# Patient Record
Sex: MALE | Race: WHITE | NOT HISPANIC OR LATINO | Employment: STUDENT | URBAN - METROPOLITAN AREA
[De-identification: names, ages, dates, MRNs, and addresses within clinical notes are randomized per-mention and may not be internally consistent; named-entity substitution may affect disease eponyms.]

---

## 2022-08-19 ENCOUNTER — HOSPITAL ENCOUNTER (EMERGENCY)
Facility: HOSPITAL | Age: 22
Discharge: HOME | End: 2022-08-20
Attending: EMERGENCY MEDICINE
Payer: COMMERCIAL

## 2022-08-19 VITALS
OXYGEN SATURATION: 97 % | SYSTOLIC BLOOD PRESSURE: 147 MMHG | HEIGHT: 70 IN | RESPIRATION RATE: 18 BRPM | DIASTOLIC BLOOD PRESSURE: 82 MMHG | HEART RATE: 80 BPM | BODY MASS INDEX: 25.05 KG/M2 | WEIGHT: 175 LBS

## 2022-08-19 DIAGNOSIS — S61.219A LACERATION OF FINGER: Primary | ICD-10-CM

## 2022-08-19 PROCEDURE — 90471 IMMUNIZATION ADMIN: CPT

## 2022-08-19 PROCEDURE — 90715 TDAP VACCINE 7 YRS/> IM: CPT

## 2022-08-19 PROCEDURE — 99282 EMERGENCY DEPT VISIT SF MDM: CPT | Performed by: EMERGENCY MEDICINE

## 2022-08-19 PROCEDURE — 99283 EMERGENCY DEPT VISIT LOW MDM: CPT

## 2022-08-19 RX ORDER — TRANEXAMIC ACID 100 MG/ML
1000 INJECTION, SOLUTION INTRAVENOUS ONCE
Status: COMPLETED | OUTPATIENT
Start: 2022-08-19 | End: 2022-08-19

## 2022-08-19 RX ORDER — LIDOCAINE HYDROCHLORIDE AND EPINEPHRINE 10; 10 MG/ML; UG/ML
10 INJECTION, SOLUTION INFILTRATION; PERINEURAL ONCE
Status: DISCONTINUED | OUTPATIENT
Start: 2022-08-19 | End: 2022-08-19

## 2022-08-19 RX ORDER — LIDOCAINE HYDROCHLORIDE AND EPINEPHRINE 10; 10 MG/ML; UG/ML
20 INJECTION, SOLUTION INFILTRATION; PERINEURAL ONCE
Status: COMPLETED | OUTPATIENT
Start: 2022-08-19 | End: 2022-08-19

## 2022-08-19 RX ADMIN — TRANEXAMIC ACID 1000 MG: 100 INJECTION, SOLUTION INTRAVENOUS at 22:55

## 2022-08-19 RX ADMIN — LIDOCAINE HYDROCHLORIDE,EPINEPHRINE BITARTRATE 20 ML: 10; .01 INJECTION, SOLUTION INFILTRATION; PERINEURAL at 22:17

## 2022-08-19 RX ADMIN — TETANUS TOXOID, REDUCED DIPHTHERIA TOXOID AND ACELLULAR PERTUSSIS VACCINE, ADSORBED 0.5 ML: 5; 2.5; 8; 8; 2.5 SUSPENSION INTRAMUSCULAR at 22:18

## 2022-08-20 NOTE — DISCHARGE INSTRUCTIONS
You were evaluated for a laceration to the left thumb  There is no bone involvement  There is nothing to suture  A tetanus vaccine was given  Bleeding was controlled after lidocaine with 1% epinephrine injection, topical TXA, and surgifoam  Please follow-up with your primary care doctor for continued monitoring and further management  PLEASE RETURN TO THE NEAREST EMERGENCY ROOM IF YOU DEVELOP FEVER, CHILLS, UNCONTROLLED BLEEDING FROM THUMB, PUS DRAINING FROM THE WOUND

## 2022-08-20 NOTE — ED ATTENDING ATTESTATION
Final Diagnosis:  1  Laceration of finger      ED Course as of 08/20/22 1803   Fri Aug 19, 2022   2238 Patient has continued venous oozing  Will do topical TXA    +/- cautery       ISara MD, saw and evaluated the patient  All available labs and X-rays were ordered by me or the resident and have been reviewed by myself  I discussed the patient with the resident / non-physician and agree with the resident's / non-physician practitioner's findings and plan as documented in the resident's / non-physician practicitioner's note, except where noted  At this point, I agree with the current assessment done in the ED  I was present during key portions of all procedures performed unless otherwise stated  Chief Complaint   Patient presents with    Laceration     Pt cut left thumb with knife     This is a 24 y o  male presenting for evaluation of LEFT thumb laceraiton cutting the tip of LEFT thumb off  Uncertain last tetanus  PMH:   has no past medical history on file  PSH:   has no past surgical history on file  Social:  Social History     Substance and Sexual Activity   Alcohol Use Not Currently     Social History     Tobacco Use   Smoking Status Not on file   Smokeless Tobacco Never Used     Social History     Substance and Sexual Activity   Drug Use Yes    Types: Marijuana     PE:  Vitals:    08/19/22 2112   BP: 147/82   BP Location: Right arm   Pulse: 80   Resp: 18   SpO2: 97%   Weight: 79 4 kg (175 lb)   Height: 5' 10" (1 778 m)   General: VS reviewed  Appears in NAD  awake, alert  Well-nourished, well-developed  Appears stated age  Speaking normally in full sentences  Head: Normocephalic, atraumatic  Eyes: EOM-I  No diplopia  No hyphema  No subconjunctival hemorrhages  Symmetrical lids  ENT: Atraumatic external nose and ears  MMM  No malocclusion  No stridor  Normal phonation  No drooling  Normal swallowing  Neck: No JVD    CV: No pallor noted  Lungs:   No tachypnea  No respiratory distress  MSK:   FROM spontaneously  Skin: Dry, laceration present on distal thumb  It looks like there's the tiniest little venous ooze occurrping on the more radial aspect of the laceration  It's very very slowly dripping  Neuro: Awake, alert, GCS15, CN II-XII grossly intact  Motor grossly intact  Psychiatric/Behavioral: Appropriate mood and affect   Exam: deferred  A:  - partial thumb tip laceration  P:  - local wound care ? apply finger tourniquet ? topical lidocaine ? inject lido/epi ? topical TXA  - nothing to suture  - definitely no bone involvement  - tetanus vaccine   - cobane dressing      - 13 point ROS was performed and all are normal unless stated in the history above  - Nursing note reviewed  Vitals reviewed  - Orders placed by myself and/or advanced practitioner / resident     - Previous chart was reviewed  - No language barrier    - History obtained from patient  - There are no limitations to the history obtained  - Critical care time: Not applicable for this patient  Code Status: No Order  Advance Directive and Living Will:      Power of :    POLST:      Medications   tetanus-diphtheria-acellular pertussis (BOOSTRIX) IM injection 0 5 mL (0 5 mL Intramuscular Given 8/19/22 2218)   lidocaine-epinephrine (XYLOCAINE/EPINEPHRINE) 1 %-1:100,000 injection 20 mL (20 mL Infiltration Given by Other 8/19/22 2217)   tranexamic acid 100mg/mL (for epistaxis) 1,000 mg (1,000 mg Nasal Given by Other 8/19/22 9698)     No orders to display     No orders of the defined types were placed in this encounter      Labs Reviewed - No data to display  Time reflects when diagnosis was documented in both MDM as applicable and the Disposition within this note       Time User Action Codes Description Comment    8/19/2022 10:11 PM Feliz Monahan Add [[id number]] Laceration of finger           ED Disposition       ED Disposition   Discharge    Condition   Stable    Date/Time   Sat Aug 20, 2022 12:15 AM    Comment   Ronal Norman discharge to home/self care  Follow-up Information    None       There are no discharge medications for this patient  No discharge procedures on file  None       Portions of the record may have been created with voice recognition software  Occasional wrong word or "sound a like" substitutions may have occurred due to the inherent limitations of voice recognition software  Read the chart carefully and recognize, using context, where substitutions have occurred      Electronically signed by:  Mariela Murray

## 2022-08-20 NOTE — ED PROVIDER NOTES
History  Chief Complaint   Patient presents with    Laceration     Pt cut left thumb with knife     59-year-old healthy male presents to the ED for left thumb laceration  States he was cutting up spinach tonight when the knife slipped and he accidentally cut the tip of his left thumb off  No other injuries  Unsure of tetanus vaccine status  Denies being on blood thinners  History provided by:  Patient   used: No        None       History reviewed  No pertinent past medical history  History reviewed  No pertinent surgical history  History reviewed  No pertinent family history  I have reviewed and agree with the history as documented  E-Cigarette/Vaping    E-Cigarette Use Never User      E-Cigarette/Vaping Substances     Social History     Tobacco Use    Smokeless tobacco: Never Used   Vaping Use    Vaping Use: Never used   Substance Use Topics    Alcohol use: Not Currently    Drug use: Yes     Types: Marijuana        Review of Systems   Constitutional: Negative for chills and fever  Skin:        Left thumb laceration   Neurological: Negative for weakness and numbness  All other systems reviewed and are negative  Physical Exam  ED Triage Vitals [08/19/22 2112]   Temp Pulse Respirations Blood Pressure SpO2   -- 80 18 147/82 97 %      Temp src Heart Rate Source Patient Position - Orthostatic VS BP Location FiO2 (%)   -- Monitor Lying Right arm --      Pain Score       5             Orthostatic Vital Signs  Vitals:    08/19/22 2112   BP: 147/82   Pulse: 80   Patient Position - Orthostatic VS: Lying       Physical Exam  Vitals and nursing note reviewed  Constitutional:       General: He is not in acute distress  Appearance: Normal appearance  He is not ill-appearing, toxic-appearing or diaphoretic  HENT:      Head: Normocephalic and atraumatic     Eyes:      Conjunctiva/sclera: Conjunctivae normal    Cardiovascular:      Rate and Rhythm: Normal rate and regular rhythm  Pulses: Normal pulses  Heart sounds: Normal heart sounds  Pulmonary:      Effort: Pulmonary effort is normal  No respiratory distress  Breath sounds: Normal breath sounds  No wheezing  Skin:     General: Skin is warm and dry  Capillary Refill: Capillary refill takes less than 2 seconds  Comments: Tip of left thumb avulsion  Blood is oozing very slowly from the wound  There is no bone involvement  Neurological:      General: No focal deficit present  Mental Status: He is alert and oriented to person, place, and time  ED Medications  Medications   tetanus-diphtheria-acellular pertussis (BOOSTRIX) IM injection 0 5 mL (0 5 mL Intramuscular Given 8/19/22 2218)   lidocaine-epinephrine (XYLOCAINE/EPINEPHRINE) 1 %-1:100,000 injection 20 mL (20 mL Infiltration Given by Other 8/19/22 2217)   tranexamic acid 100mg/mL (for epistaxis) 1,000 mg (1,000 mg Nasal Given by Other 8/19/22 2255)       Diagnostic Studies  Results Reviewed     None                 No orders to display         Procedures  Procedures      ED Course  ED Course as of 08/20/22 0150   [facility] Aug 19, 2022   2137 Blood Pressure: 147/82   2137 Pulse: 80   2137 Respirations: 18   2137 SpO2: 97 %   2158 Left thumb tourniquet applied  Lidocaine w 1% epi injected into wound  Good hemostasis achieved  Will reassess in a few minutes  2236 On reassessment, bleeding continued  More lido w/ epi injected without much improvement  Will use topical TXA next  2257 Topical TXA applied  Will reassess in 15 minutes  2341 Continued venous oozing  Topical TXA applied again  2351 Took down finger tourniquet  Surgifoam applied and thumb is wrapped with kerlix  Will reassess in 15 minutes  Sat Aug 20, 2022   0011 Good hemostasis achieved after application of surgifoam  Patient was monitored for 20 minutes after it was applied  Ok to discharge home                                          MDM  Number of Diagnoses or Management Options  Laceration of finger  Diagnosis management comments: 20-year-old healthy male presents today for evaluation of left thumb laceration  Vitals stable  Exam significant for left thumb tip avulsion with slow venous blood oozing from the wound  There is no bony involvement  There is nothing to suture  Tetanus vaccine given  Tourniquet applied to left thumb initially  Good hemostasis achieved with lidocaine with 1% epinephrine injected into the wound, topical TXA, and surgifoam   Stable for discharge home  Return precautions given  Instructed patient to follow-up with PCP for continued monitoring and further management  Patient agrees with the plan of care  Disposition  Final diagnoses:   Laceration of finger     Time reflects when diagnosis was documented in both MDM as applicable and the Disposition within this note     Time User Action Codes Description Comment    8/19/2022 10:11 PM Lobo Christopher Add [[id number]] Laceration of finger       ED Disposition     ED Disposition   Discharge    Condition   Stable    Date/Time   Sat Aug 20, 2022 12:15 AM    Comment   Candy Means discharge to home/self care  Follow-up Information    None         There are no discharge medications for this patient  No discharge procedures on file  PDMP Review     None           ED Provider  Attending physically available and evaluated Candy Means I managed the patient along with the ED Attending      Electronically Signed by         Laretta Brittle, MD  08/20/22 7403

## 2023-12-04 ENCOUNTER — TELEPHONE (OUTPATIENT)
Dept: PHYSICAL THERAPY | Facility: REHABILITATION | Age: 23
End: 2023-12-04

## 2023-12-04 NOTE — TELEPHONE ENCOUNTER
[address] [facility] received scheduling request for this patient for a direct access evaluation; [facility] called patient approx 3:45 today and left voicemail offering to schedule.

## 2024-01-17 NOTE — PROGRESS NOTES
1. Acute midline low back pain without sciatica      Orders Placed This Encounter   Procedures   • XR spine lumbar 2 or 3 views injury   • Ambulatory Referral to Physical Therapy     No orders of the defined types were placed in this encounter.     IMAGING STUDIES: (I personally reviewed images in PACS and report):     PAST REPORTS:    ASSESSMENT/PLAN:    Acute low back pain s/p deadlift injury  DOI around September 2023  FUI around 4 months    DDX:  Lumbar muscle strain  Pars injury    Patient prefers to check insurance coverage before deciding X-ray and physical therapy.    Repeat X-ray next visit: None    Return if symptoms worsen or fail to improve.    Patient instructions below verbally summarized in person during encounter:  Patient Instructions   Start physical therapy  Avoid deadlift or extension exercises    _________________________________________________________________________    HISTORY OF PRESENT ILLNESS:    23 y.o. male Sagrario presents for initial evaluation of insidious onset low back pain started about 4 months ago after deadlift injury.    1/18/2024:  Today patient reports     Athlete: No  Injury related: lifting a heavy object, was performing a 225 lbs deadlift and felt a popping sensation. No immediate pain. Started to feel pain and stiffness the day after.   PMHX: 2014 back injury after similar lifting injury, lasted 4 months and resolved with home stretching.    Low back pain:  Starting/onset: acute onset  Location: mid lumbar area  Pain scale: 2/10, pain from condition does not interfere with activities of daily living  Description: aching and dull  Radiation: none  Night pain: no  Rest pain: no  Aggravation: physical activity, twisting/turning, and lifting  Neuro sx: denies weakness, numbness, tingling, or burning pain in either leg  Red flag symptoms: denies any saddle anesthesia, new onset bowel/bladder incontinence, fevers, personal history of cancer, unintentional weight loss,  "weakness  Treatment: home exercises  Previous PT: none (due to high co-pay)  Previous epidural: none    Review of Systems    Following history reviewed and updated:  Historical Information   Past Medical History:   Diagnosis Date   • Ankle fracture    • Ankle ligament laxity      History reviewed. No pertinent surgical history.  Social History     Socioeconomic History   • Marital status: Single     Spouse name: Not on file   • Number of children: Not on file   • Years of education: Not on file   • Highest education level: Not on file   Occupational History   • Not on file   Tobacco Use   • Smoking status: Never   • Smokeless tobacco: Never   Vaping Use   • Vaping status: Never Used   Substance and Sexual Activity   • Alcohol use: Not Currently     Comment: occ   • Drug use: Yes     Types: Marijuana   • Sexual activity: Yes     Partners: Female   Other Topics Concern   • Not on file   Social History Narrative   • Not on file     Social Determinants of Health     Financial Resource Strain: Not on file   Food Insecurity: Not on file   Transportation Needs: Not on file   Physical Activity: Not on file   Stress: Not on file   Social Connections: Not on file   Intimate Partner Violence: Not on file   Housing Stability: Not on file     History reviewed. No pertinent family history.   No Known Allergies    /82 (BP Location: Left arm, Patient Position: Sitting, Cuff Size: Standard)   Ht 5' 10\" (1.778 m)   Wt 79.4 kg (175 lb)   BMI 25.11 kg/m²   Physical Exam  Constitutional:  Vitals and nursing note reviewed.   General: Normal appearance. Not ill-appearing, toxic-appearing or diaphoretic. not in acute distress.  HENT: Head is normocephalic and atraumatic. Bilateral external ear and nose normal  Eyes: No discharge. Extraocular movements intact.   Cardiovascular: Normal rate  Pulmonary:  Pulmonary effort is normal. No respiratory distress.   Musculoskeletal: No gross injury or deformity except for other than mention " below.  Skin: Skin is warm.   Neurological: Awake, alert, and mental status is at baseline. No gross focal deficit present.  Psychiatric:  Mood normal. Behavior normal.     Ortho Exam    Lumbar Spine Examination:  Patient ambulates with Normal gait pattern  Assistive Device: No  Skin is warm and dry to touch with no wounds, erythema, increased warmth, or ecchymosis     + L5-S1 midline tenderness  No paraspinal tenderness  Intact sensation to light touch over upper thigh (L1), mid thigh (L2), medial knee (L3), medial malleolus (L4), dorsum of foot (L5), lateral malleolus (S1), and posterior knee (S2)  Full active ROM without pain or limitations in lumbar flexion (90°), extension (to neutral), lateral bending (45° left, 45° right) and rotation (45° left, 45° right)  5/5 strength with hip flexion (L2), knee extension (L3), ankle dorsiflexion (L4), great toe extension (L5), and ankle plantarflexion (S1)  Straight leg raise (supine): Negative bilaterally  Stork test: + Pain left side  Clonus (S1-S2):  negative  Patellar reflex (L3-L4):  2+ and symmetric  Candie SI: No pain    Right hip:  Tenderness: None  Full passive ROM with flexion 140°, extension 0° supine on exam table, IR 40° at 90° flexion: , ER 40° at 90° flexion, Abduction 45° at 90° flexion, and Adduction 20° at 90° flexion  5/5 strength with flexion, abduction, adduction against resistance  Log roll test: negative  LEXIE (Ricci's):  negative  FADIR: negative    Left hip:  Tenderness: None  Full passive ROM with flexion 140°, extension 0° supine on exam table, IR 40° at 90° flexion: , ER 40° at 90° flexion, Abduction 45° at 90° flexion, and Adduction 20° at 90° flexion  5/5 strength with flexion, abduction, adduction against resistance  Log roll test: negative  LEXIE (Ricci's):  negative  FADIR: negative    __________________________________________________________________________  Procedures

## 2024-01-18 VITALS
HEIGHT: 70 IN | BODY MASS INDEX: 25.05 KG/M2 | DIASTOLIC BLOOD PRESSURE: 82 MMHG | WEIGHT: 175 LBS | SYSTOLIC BLOOD PRESSURE: 132 MMHG

## 2024-01-18 DIAGNOSIS — M54.50 ACUTE MIDLINE LOW BACK PAIN WITHOUT SCIATICA: Primary | ICD-10-CM

## 2024-01-18 PROCEDURE — 99203 OFFICE O/P NEW LOW 30 MIN: CPT | Performed by: FAMILY MEDICINE

## 2024-01-29 ENCOUNTER — APPOINTMENT (OUTPATIENT)
Dept: RADIOLOGY | Facility: OTHER | Age: 24
End: 2024-01-29
Payer: COMMERCIAL

## 2024-01-29 DIAGNOSIS — M54.50 ACUTE MIDLINE LOW BACK PAIN WITHOUT SCIATICA: ICD-10-CM

## 2024-01-29 PROCEDURE — 72100 X-RAY EXAM L-S SPINE 2/3 VWS: CPT

## 2024-01-31 NOTE — PROGRESS NOTES
1. Acute midline low back pain without sciatica      Orders Placed This Encounter   Procedures   • MRI lumbar spine wo contrast     IMAGING STUDIES: (I personally reviewed images in PACS and report):     PAST REPORTS:    XR lumbar spine 1/29/2024  L4-5 and L5-S1 disc degeneration.   There is no evidence of acute fracture or destructive osseous lesion.     ASSESSMENT/PLAN:    Acute low back pain s/p deadlift injury  DDX:  Pars defect  Lumbar strain  DOI around September 2023  FUI around 4 months    Patient will reach out to his insurance regarding coverage for MRI lumbar spine. In the mean time, I recommend patient to continue treating this as a possible pars defect with flexion-bias home exercise and avoid aggravating activities.    Repeat X-ray next visit: None    Return if symptoms worsen or fail to improve.    Patient instructions below verbally summarized in person during encounter:  Patient Instructions   Continue flexion-bias home exercise  Avoid deadlift and squatting exercises for 2 more months  Schedule formal physical therapy for evaluation (referral from previous visit)    __________________________________________________________________________    HISTORY OF PRESENT ILLNESS:    23 y.o. male presents for 2 weeks follow-up of acute lumbar back pain s/p dead lift injury around Sept 2023.  Last visit on 1/18/2024 patient was recommended evaluation with x-ray for possible pars defect.    2/1/2024:  Today patient reports 50% improvement of his back pain after removing deadlift and squatting from his gym routine. Denies any sciatica symptoms.  Denies any saddle anesthesia, new onset bowel/bladder incontinence, fevers, personal history of cancer, unintentional weight loss, or weakness.    Review of Systems    Following history reviewed and updated:  Historical Information   Past Medical History:   Diagnosis Date   • Ankle fracture    • Ankle ligament laxity      No past surgical history on file.  Social History  "    Socioeconomic History   • Marital status: Single     Spouse name: Not on file   • Number of children: Not on file   • Years of education: Not on file   • Highest education level: Not on file   Occupational History   • Not on file   Tobacco Use   • Smoking status: Never   • Smokeless tobacco: Never   Vaping Use   • Vaping status: Never Used   Substance and Sexual Activity   • Alcohol use: Not Currently     Comment: occ   • Drug use: Yes     Types: Marijuana   • Sexual activity: Yes     Partners: Female   Other Topics Concern   • Not on file   Social History Narrative   • Not on file     Social Determinants of Health     Financial Resource Strain: Not on file   Food Insecurity: Not on file   Transportation Needs: Not on file   Physical Activity: Not on file   Stress: Not on file   Social Connections: Not on file   Intimate Partner Violence: Not on file   Housing Stability: Not on file     No family history on file.   No Known Allergies    /84 (BP Location: Left arm, Patient Position: Sitting, Cuff Size: Standard)   Ht 5' 10\" (1.778 m)   Wt 79.4 kg (175 lb)   BMI 25.11 kg/m²     Physical Exam  Constitutional:  Vitals and nursing note reviewed.   General: Normal appearance. Not ill-appearing, toxic-appearing or diaphoretic. not in acute distress.  HENT: Head is normocephalic and atraumatic. Bilateral external ear and nose normal  Eyes: No discharge. Extraocular movements intact.   Cardiovascular: Normal rate  Pulmonary:  Pulmonary effort is normal. No respiratory distress.   Musculoskeletal: No gross injury or deformity except for other than mention below.  Skin: Skin is warm.   Neurological: Awake, alert, and mental status is at baseline. No gross focal deficit present.  Psychiatric:  Mood normal. Behavior normal.     Ortho Exam    Lumbar Spine Examination:  Patient ambulates with Normal gait pattern  Assistive Device: No  Skin is warm and dry to touch with no wounds, erythema, increased warmth, or " ecchymosis     No palpable midline tenderness or paraspinal muscle spasm  Intact sensation to light touch over upper thigh (L1), mid thigh (L2), medial knee (L3), medial malleolus (L4), dorsum of foot (L5), lateral malleolus (S1), and posterior knee (S2)  Full active ROM without pain or limitations in lumbar flexion (90°), extension (to neutral), lateral bending (45° left, 45° right) and rotation (45° left, 45° right)  5/5 strength with hip flexion (L2), knee extension (L3), ankle dorsiflexion (L4), great toe extension (L5), and ankle plantarflexion (S1)  Straight leg raise (supine): Negative bilaterally  Stork test: No Pain  Candie SI: No pain      __________________________________________________________________________  Procedures

## 2024-02-01 ENCOUNTER — OFFICE VISIT (OUTPATIENT)
Dept: OBGYN CLINIC | Facility: OTHER | Age: 24
End: 2024-02-01
Payer: COMMERCIAL

## 2024-02-01 VITALS
SYSTOLIC BLOOD PRESSURE: 122 MMHG | BODY MASS INDEX: 25.05 KG/M2 | WEIGHT: 175 LBS | DIASTOLIC BLOOD PRESSURE: 84 MMHG | HEIGHT: 70 IN

## 2024-02-01 DIAGNOSIS — M54.50 ACUTE MIDLINE LOW BACK PAIN WITHOUT SCIATICA: Primary | ICD-10-CM

## 2024-02-01 PROCEDURE — 99213 OFFICE O/P EST LOW 20 MIN: CPT | Performed by: FAMILY MEDICINE

## 2024-02-01 NOTE — PATIENT INSTRUCTIONS
Continue flexion-bias home exercise  Avoid deadlift and squatting exercises for 2 more months  Schedule formal physical therapy for evaluation (referral from previous visit)

## 2024-02-27 ENCOUNTER — NEW PATIENT (OUTPATIENT)
Dept: URBAN - METROPOLITAN AREA CLINIC 6 | Facility: CLINIC | Age: 24
End: 2024-02-27

## 2024-02-27 DIAGNOSIS — L72.3: ICD-10-CM

## 2024-02-27 PROCEDURE — 92002 INTRM OPH EXAM NEW PATIENT: CPT

## 2024-02-27 ASSESSMENT — VISUAL ACUITY
OS_SC: 20/20
OD_SC: 20/20

## 2024-02-27 ASSESSMENT — TONOMETRY
OS_IOP_MMHG: 17
OD_IOP_MMHG: 18

## 2024-03-05 ENCOUNTER — PROCEDURE ONLY (OUTPATIENT)
Dept: URBAN - METROPOLITAN AREA CLINIC 6 | Facility: CLINIC | Age: 24
End: 2024-03-05

## 2024-03-05 DIAGNOSIS — L72.3: ICD-10-CM

## 2024-03-05 PROCEDURE — 67840 REMOVE EYELID LESION: CPT

## 2024-03-05 ASSESSMENT — VISUAL ACUITY
OD_SC: 20/20
OS_SC: 20/20

## 2024-03-05 ASSESSMENT — TONOMETRY
OD_IOP_MMHG: 17
OS_IOP_MMHG: 17

## 2024-03-21 ENCOUNTER — FOLLOW UP (OUTPATIENT)
Dept: URBAN - METROPOLITAN AREA CLINIC 6 | Facility: CLINIC | Age: 24
End: 2024-03-21

## 2024-03-21 DIAGNOSIS — L72.3: ICD-10-CM

## 2024-03-21 PROCEDURE — 99024 POSTOP FOLLOW-UP VISIT: CPT

## 2024-03-21 ASSESSMENT — VISUAL ACUITY
OS_SC: 20/20
OD_SC: 20/20

## 2024-03-21 ASSESSMENT — TONOMETRY
OD_IOP_MMHG: 16
OS_IOP_MMHG: 16

## 2024-04-18 ENCOUNTER — FOLLOW UP (OUTPATIENT)
Dept: URBAN - METROPOLITAN AREA CLINIC 6 | Facility: CLINIC | Age: 24
End: 2024-04-18

## 2024-04-18 DIAGNOSIS — L72.3: ICD-10-CM

## 2024-04-18 PROCEDURE — 92012 INTRM OPH EXAM EST PATIENT: CPT

## 2024-04-18 ASSESSMENT — TONOMETRY
OS_IOP_MMHG: 14
OD_IOP_MMHG: 14

## 2024-04-18 ASSESSMENT — VISUAL ACUITY
OD_SC: 20/20
OS_SC: 20/20